# Patient Record
Sex: MALE | Race: WHITE | NOT HISPANIC OR LATINO | Employment: FULL TIME | URBAN - METROPOLITAN AREA
[De-identification: names, ages, dates, MRNs, and addresses within clinical notes are randomized per-mention and may not be internally consistent; named-entity substitution may affect disease eponyms.]

---

## 2021-07-17 ENCOUNTER — HOSPITAL ENCOUNTER (EMERGENCY)
Facility: HOSPITAL | Age: 51
Discharge: HOME | End: 2021-07-18
Attending: EMERGENCY MEDICINE | Admitting: EMERGENCY MEDICINE

## 2021-07-17 DIAGNOSIS — I10 ESSENTIAL HYPERTENSION: Primary | ICD-10-CM

## 2021-07-17 DIAGNOSIS — R53.83 EXHAUSTION: ICD-10-CM

## 2021-07-17 DIAGNOSIS — R25.1 SHAKING: ICD-10-CM

## 2021-07-17 PROCEDURE — 99283 EMERGENCY DEPT VISIT LOW MDM: CPT

## 2021-07-18 VITALS
OXYGEN SATURATION: 97 % | TEMPERATURE: 98.2 F | RESPIRATION RATE: 16 BRPM | SYSTOLIC BLOOD PRESSURE: 142 MMHG | DIASTOLIC BLOOD PRESSURE: 91 MMHG | HEART RATE: 68 BPM

## 2021-07-18 LAB
ALBUMIN SERPL-MCNC: 4.4 G/DL (ref 3.5–5.2)
ALBUMIN/GLOB SERPL: 1.9 G/DL
ALP SERPL-CCNC: 46 U/L (ref 39–117)
ALT SERPL W P-5'-P-CCNC: 24 U/L (ref 1–41)
ANION GAP SERPL CALCULATED.3IONS-SCNC: 11.9 MMOL/L (ref 5–15)
AST SERPL-CCNC: 17 U/L (ref 1–40)
BASOPHILS # BLD AUTO: 0.04 10*3/MM3 (ref 0–0.2)
BASOPHILS NFR BLD AUTO: 0.6 % (ref 0–1.5)
BILIRUB SERPL-MCNC: 0.2 MG/DL (ref 0–1.2)
BUN SERPL-MCNC: 14 MG/DL (ref 6–20)
BUN/CREAT SERPL: 14.9 (ref 7–25)
CALCIUM SPEC-SCNC: 9.3 MG/DL (ref 8.6–10.5)
CHLORIDE SERPL-SCNC: 106 MMOL/L (ref 98–107)
CO2 SERPL-SCNC: 25.1 MMOL/L (ref 22–29)
CREAT SERPL-MCNC: 0.94 MG/DL (ref 0.76–1.27)
D DIMER PPP FEU-MCNC: <0.27 MCGFEU/ML (ref 0–0.49)
DEPRECATED RDW RBC AUTO: 40.6 FL (ref 37–54)
EOSINOPHIL # BLD AUTO: 0.42 10*3/MM3 (ref 0–0.4)
EOSINOPHIL NFR BLD AUTO: 6.1 % (ref 0.3–6.2)
ERYTHROCYTE [DISTWIDTH] IN BLOOD BY AUTOMATED COUNT: 13 % (ref 12.3–15.4)
GFR SERPL CREATININE-BSD FRML MDRD: 85 ML/MIN/1.73
GLOBULIN UR ELPH-MCNC: 2.3 GM/DL
GLUCOSE SERPL-MCNC: 115 MG/DL (ref 65–99)
HCT VFR BLD AUTO: 42.2 % (ref 37.5–51)
HGB BLD-MCNC: 13.8 G/DL (ref 13–17.7)
IMM GRANULOCYTES # BLD AUTO: 0.02 10*3/MM3 (ref 0–0.05)
IMM GRANULOCYTES NFR BLD AUTO: 0.3 % (ref 0–0.5)
LYMPHOCYTES # BLD AUTO: 2.12 10*3/MM3 (ref 0.7–3.1)
LYMPHOCYTES NFR BLD AUTO: 30.9 % (ref 19.6–45.3)
MCH RBC QN AUTO: 28.4 PG (ref 26.6–33)
MCHC RBC AUTO-ENTMCNC: 32.7 G/DL (ref 31.5–35.7)
MCV RBC AUTO: 86.8 FL (ref 79–97)
MONOCYTES # BLD AUTO: 0.6 10*3/MM3 (ref 0.1–0.9)
MONOCYTES NFR BLD AUTO: 8.7 % (ref 5–12)
NEUTROPHILS NFR BLD AUTO: 3.67 10*3/MM3 (ref 1.7–7)
NEUTROPHILS NFR BLD AUTO: 53.4 % (ref 42.7–76)
NRBC BLD AUTO-RTO: 0 /100 WBC (ref 0–0.2)
PLATELET # BLD AUTO: 211 10*3/MM3 (ref 140–450)
PMV BLD AUTO: 9.9 FL (ref 6–12)
POTASSIUM SERPL-SCNC: 4.1 MMOL/L (ref 3.5–5.2)
PROT SERPL-MCNC: 6.7 G/DL (ref 6–8.5)
QT INTERVAL: 390 MS
RBC # BLD AUTO: 4.86 10*6/MM3 (ref 4.14–5.8)
SODIUM SERPL-SCNC: 143 MMOL/L (ref 136–145)
TROPONIN T SERPL-MCNC: <0.01 NG/ML (ref 0–0.03)
TSH SERPL DL<=0.05 MIU/L-ACNC: 3.86 UIU/ML (ref 0.27–4.2)
WBC # BLD AUTO: 6.87 10*3/MM3 (ref 3.4–10.8)

## 2021-07-18 PROCEDURE — 85025 COMPLETE CBC W/AUTO DIFF WBC: CPT | Performed by: EMERGENCY MEDICINE

## 2021-07-18 PROCEDURE — 93005 ELECTROCARDIOGRAM TRACING: CPT | Performed by: EMERGENCY MEDICINE

## 2021-07-18 PROCEDURE — 85379 FIBRIN DEGRADATION QUANT: CPT | Performed by: EMERGENCY MEDICINE

## 2021-07-18 PROCEDURE — 93010 ELECTROCARDIOGRAM REPORT: CPT | Performed by: INTERNAL MEDICINE

## 2021-07-18 PROCEDURE — 84484 ASSAY OF TROPONIN QUANT: CPT | Performed by: EMERGENCY MEDICINE

## 2021-07-18 PROCEDURE — 84443 ASSAY THYROID STIM HORMONE: CPT | Performed by: EMERGENCY MEDICINE

## 2021-07-18 PROCEDURE — 80053 COMPREHEN METABOLIC PANEL: CPT | Performed by: EMERGENCY MEDICINE

## 2021-07-18 RX ORDER — NEBIVOLOL 5 MG/1
5 TABLET ORAL DAILY
COMMUNITY

## 2021-07-18 RX ORDER — HYDROXYZINE HYDROCHLORIDE 25 MG/1
25 TABLET, FILM COATED ORAL 3 TIMES DAILY PRN
Qty: 15 TABLET | Refills: 0 | Status: SHIPPED | OUTPATIENT
Start: 2021-07-18

## 2021-07-18 RX ORDER — ALPRAZOLAM 0.25 MG/1
0.5 TABLET ORAL ONCE
Status: COMPLETED | OUTPATIENT
Start: 2021-07-18 | End: 2021-07-18

## 2021-07-18 RX ADMIN — ALPRAZOLAM 0.5 MG: 0.25 TABLET ORAL at 00:25

## 2021-07-18 NOTE — ED PROVIDER NOTES
EMERGENCY DEPARTMENT ENCOUNTER  Patient was placed in face mask in first look and the following protective measures were taken unless additional measures were taken and documented below in the ED course. Patient was wearing facemask when I entered the room and throughout our encounter. I wore full protective equipment throughout this patient encounter including a face mask, and gloves. Hand hygiene was performed before donning protective equipment and after removal when leaving the room.    Room Number:  17/17  Date of encounter:  7/18/2021  PCP: John Haq MD    HPI:  Context: Rodrigo Villanueva is a 50 y.o. male who presents to the ED c/o chief complaint of evaded blood pressure and shaking.  Patient states he slept approximately 4 hours last night and then drove from [place], [place] to here today.  He states he checked his blood pressure this evening and was 170/114.  He is on Bystolic, 5 mg and has not missed any doses of this.  He states once he saw his blood pressure he started feeling he was shaking all over and was nauseated.  He states he has a mild nervous stomach sensation in his upper abdomen.  He denies dizziness, diaphoresis, vomiting or fever.  He does not smoke or drink alcohol.  He denies headache or abdominal pain.    MEDICAL HISTORY REVIEW  Reviewed in EPIC    PAST MEDICAL HISTORY  Active Ambulatory Problems     Diagnosis Date Noted   • No Active Ambulatory Problems     Resolved Ambulatory Problems     Diagnosis Date Noted   • No Resolved Ambulatory Problems     No Additional Past Medical History       PAST SURGICAL HISTORY  No past surgical history on file.    FAMILY HISTORY  No family history on file.    SOCIAL HISTORY  Social History     Socioeconomic History   • Marital status:      Spouse name: Not on file   • Number of children: Not on file   • Years of education: Not on file   • Highest education level: Not on file       ALLERGIES  Patient has no known allergies.    The  patient's allergies have been reviewed    REVIEW OF SYSTEMS  All systems reviewed and negative except for those discussed in HPI.     PHYSICAL EXAM  I have reviewed the triage vital signs and nursing notes.  ED Triage Vitals [07/17/21 2338]   Temp Heart Rate Resp BP SpO2   98.2 °F (36.8 °C) 78 16 (!) 156/110 97 %      Temp src Heart Rate Source Patient Position BP Location FiO2 (%)   Tympanic Monitor -- -- --       General: Mild distress and anxious appearing.  HENT: NCAT, PERRL, Nares patent.  Eyes: no scleral icterus.  Ocular movements are intact.  Neck: trachea midline, no ROM limitations.  CV: regular rhythm, regular rate.  Respiratory: normal effort, CTAB.  Abdomen: soft, nondistended, NTTP, no rebound tenderness, no guarding or rigidity  : deferred.  Musculoskeletal: no deformity.  Neuro: alert, moves all extremities, follows commands.  Skin: warm, dry.    LAB RESULTS  Recent Results (from the past 24 hour(s))   ECG 12 Lead    Collection Time: 07/18/21 12:18 AM   Result Value Ref Range    QT Interval 390 ms   Comprehensive Metabolic Panel    Collection Time: 07/18/21 12:21 AM    Specimen: Blood   Result Value Ref Range    Glucose 115 (H) 65 - 99 mg/dL    BUN 14 6 - 20 mg/dL    Creatinine 0.94 0.76 - 1.27 mg/dL    Sodium 143 136 - 145 mmol/L    Potassium 4.1 3.5 - 5.2 mmol/L    Chloride 106 98 - 107 mmol/L    CO2 25.1 22.0 - 29.0 mmol/L    Calcium 9.3 8.6 - 10.5 mg/dL    Total Protein 6.7 6.0 - 8.5 g/dL    Albumin 4.40 3.50 - 5.20 g/dL    ALT (SGPT) 24 1 - 41 U/L    AST (SGOT) 17 1 - 40 U/L    Alkaline Phosphatase 46 39 - 117 U/L    Total Bilirubin 0.2 0.0 - 1.2 mg/dL    eGFR Non African Amer 85 >60 mL/min/1.73    Globulin 2.3 gm/dL    A/G Ratio 1.9 g/dL    BUN/Creatinine Ratio 14.9 7.0 - 25.0    Anion Gap 11.9 5.0 - 15.0 mmol/L   Troponin    Collection Time: 07/18/21 12:21 AM    Specimen: Blood   Result Value Ref Range    Troponin T <0.010 0.000 - 0.030 ng/mL   D-dimer, Quantitative    Collection Time:  07/18/21 12:21 AM    Specimen: Blood   Result Value Ref Range    D-Dimer, Quantitative <0.27 0.00 - 0.49 MCGFEU/mL   TSH    Collection Time: 07/18/21 12:21 AM    Specimen: Blood   Result Value Ref Range    TSH 3.860 0.270 - 4.200 uIU/mL   CBC Auto Differential    Collection Time: 07/18/21 12:21 AM    Specimen: Blood   Result Value Ref Range    WBC 6.87 3.40 - 10.80 10*3/mm3    RBC 4.86 4.14 - 5.80 10*6/mm3    Hemoglobin 13.8 13.0 - 17.7 g/dL    Hematocrit 42.2 37.5 - 51.0 %    MCV 86.8 79.0 - 97.0 fL    MCH 28.4 26.6 - 33.0 pg    MCHC 32.7 31.5 - 35.7 g/dL    RDW 13.0 12.3 - 15.4 %    RDW-SD 40.6 37.0 - 54.0 fl    MPV 9.9 6.0 - 12.0 fL    Platelets 211 140 - 450 10*3/mm3    Neutrophil % 53.4 42.7 - 76.0 %    Lymphocyte % 30.9 19.6 - 45.3 %    Monocyte % 8.7 5.0 - 12.0 %    Eosinophil % 6.1 0.3 - 6.2 %    Basophil % 0.6 0.0 - 1.5 %    Immature Grans % 0.3 0.0 - 0.5 %    Neutrophils, Absolute 3.67 1.70 - 7.00 10*3/mm3    Lymphocytes, Absolute 2.12 0.70 - 3.10 10*3/mm3    Monocytes, Absolute 0.60 0.10 - 0.90 10*3/mm3    Eosinophils, Absolute 0.42 (H) 0.00 - 0.40 10*3/mm3    Basophils, Absolute 0.04 0.00 - 0.20 10*3/mm3    Immature Grans, Absolute 0.02 0.00 - 0.05 10*3/mm3    nRBC 0.0 0.0 - 0.2 /100 WBC       I ordered the above labs and reviewed the results.    RADIOLOGY  No Radiology Exams Resulted Within Past 24 Hours    I ordered the above noted radiological studies. I reviewed the images and results. I agree with the radiologist interpretation.    PROCEDURES  Procedures    MEDICATIONS GIVEN IN ER  Medications   ALPRAZolam (XANAX) tablet 0.5 mg (0.5 mg Oral Given 7/18/21 0025)       PROGRESS, DATA ANALYSIS, CONSULTS, AND MEDICAL DECISION MAKING  A complete history and physical exam have been performed.  All available laboratory and imaging results have been reviewed by myself prior to disposition.    MDM  After the initial H&P, I discussed pertinent information from history and physical exam with patient/family.   Discussed differential diagnosis.  Discussed plan for ED evaluation/work-up/treatment.  All questions answered.  Patient/family is agreeable with plan.  ED Course as of Jul 18 0231   Sun Jul 18, 2021   0018 Patient presents with elevated blood pressure, anxious feeling and shaking.  We will give Xanax and check his thyroid level, troponin and D-dimer given that patient drove all day from [place].    [DE]   0029 EKG          EKG time: 0018  Rhythm/Rate: Normal sinus rhythm, rate 74  P waves and CO: normal  QRS, axis: normal   ST and T waves: normal     Interpreted Contemporaneously by me, independently viewed  Previous EKG      [DE]   0129 Today patient the results of his blood work.  Patient states he is feeling better and no longer shaky.  He asked what may be the cause of the symptoms and I suggested the lack of sleep and long drive today may have caused the stress which elevated his blood pressure.   BP: 143/94 [DE]      ED Course User Index  [DE] Calvin Cox MD       AS OF 02:31 EDT VITALS:    BP - 142/91  HR - 68  TEMP - 98.2 °F (36.8 °C) (Tympanic)  O2 SATS - 97%    DIAGNOSIS  Final diagnoses:   Essential hypertension   Shaking   Exhaustion         DISPOSITION    DISCHARGE    Patient discharged in stable condition.    Reviewed implications of results, diagnosis, meds, responsibility to follow up, warning signs and symptoms of possible worsening, potential complications and reasons to return to ER.    Patient/Family voiced understanding of above instructions.    Discussed plan for discharge, as there is no emergent indication for admission. Patient referred to primary care provider for BP management due to today's BP. Pt/family is agreeable and understands need for follow up and repeat testing.  Pt is aware that discharge does not mean that nothing is wrong but it indicates no emergency is present that requires admission and they must continue care with follow-up as given below or physician of  their choice.     FOLLOW-UP  John Haq MD  [address] [facility] [address] [phone]    Schedule an appointment as soon as possible for a visit            Medication List      New Prescriptions    hydrOXYzine 25 MG tablet  Commonly known as: ATARAX  Take 1 tablet by mouth 3 (Three) Times a Day As Needed for Anxiety.           Where to Get Your Medications      You can get these medications from any pharmacy    Bring a paper prescription for each of these medications  · hydrOXYzine 25 MG tablet          Calvin Cox MD  07/18/21 4664

## 2021-07-18 NOTE — DISCHARGE INSTRUCTIONS
Your home blood pressure medications and follow-up with your family doctor.  Please return to the emergency department symptoms worsen.  Use the hydroxyzine as needed if you start to shake.

## 2021-07-18 NOTE — ED NOTES
pt to er from home via PV c/o high blood pressure. pt checked it an hour ago and it was elevated. pt in mask in triage. Pt denies any symptoms other than shaking.   Triage in appropriate PPE     Laura Damon RN  07/17/21 2571

## 2021-09-15 ENCOUNTER — TELEPHONE (OUTPATIENT)
Dept: GASTROENTEROLOGY | Facility: CLINIC | Age: 51
End: 2021-09-15

## 2021-10-13 ENCOUNTER — OFFICE VISIT (OUTPATIENT)
Dept: GASTROENTEROLOGY | Facility: CLINIC | Age: 51
End: 2021-10-13

## 2021-10-13 ENCOUNTER — TELEPHONE (OUTPATIENT)
Dept: GASTROENTEROLOGY | Facility: CLINIC | Age: 51
End: 2021-10-13

## 2021-10-13 VITALS — WEIGHT: 212 LBS | BODY MASS INDEX: 31.4 KG/M2 | TEMPERATURE: 97.3 F | HEIGHT: 69 IN

## 2021-10-13 DIAGNOSIS — Z12.11 ENCOUNTER FOR SCREENING FOR MALIGNANT NEOPLASM OF COLON: ICD-10-CM

## 2021-10-13 DIAGNOSIS — R10.13 DYSPEPSIA: Primary | ICD-10-CM

## 2021-10-13 DIAGNOSIS — R10.13 EPIGASTRIC PAIN: ICD-10-CM

## 2021-10-13 PROCEDURE — 99204 OFFICE O/P NEW MOD 45 MIN: CPT | Performed by: NURSE PRACTITIONER

## 2021-10-13 RX ORDER — CHOLECALCIFEROL (VITAMIN D3) 125 MCG
500 CAPSULE ORAL DAILY
COMMUNITY

## 2021-10-13 RX ORDER — ESOMEPRAZOLE MAGNESIUM 40 MG/1
40 CAPSULE, DELAYED RELEASE ORAL
COMMUNITY
Start: 2021-09-08

## 2021-10-13 NOTE — PROGRESS NOTES
Chief Complaint   Patient presents with   • Heartburn       HPI     Patient seen today with the help of the [facility] .    Rodrigo Villanueva is a  51 y.o. male here establish care as a new patient for complaints of dyspepsia.    This patient will also follow with Dr. Newsome.    Patient reports 5 months of epigastric pressure/discomfort with heartburn.  No correlation to eating.  No nausea, vomiting, dysphagia, odynophagia.  Started on PPI therapy by his primary care provider with some improvement then referred to our services.    No lower GI complaints.    He needs a screening colonoscopy as well.    No family history of colon cancer.    He does not smoke, drink alcohol, or use NSAIDs.    Recent CBC and CMP normal.    History reviewed. No pertinent past medical history.    History reviewed. No pertinent surgical history.    Scheduled Meds:     Continuous Infusions: No current facility-administered medications for this visit.      PRN Meds:     No Known Allergies    Social History     Socioeconomic History   • Marital status:    Tobacco Use   • Smoking status: Former Smoker     Packs/day: 1.00     Types: Cigarettes   • Smokeless tobacco: Never Used   • Tobacco comment: quit 16 years ago   Substance and Sexual Activity   • Alcohol use: Not Currently     Comment: quit 10 years ago       History reviewed. No pertinent family history.    Review of Systems   Constitutional: Negative for activity change, appetite change, fatigue, fever and unexpected weight change.   HENT: Negative for trouble swallowing.    Eyes: Negative.    Respiratory: Negative.  Negative for apnea, cough, choking, chest tightness, shortness of breath and wheezing.    Cardiovascular: Negative.  Negative for chest pain, palpitations and leg swelling.   Gastrointestinal: Positive for abdominal pain. Negative for abdominal distention, anal bleeding, blood in stool, constipation, diarrhea, nausea, rectal pain and vomiting.   Endocrine: Negative.     Genitourinary: Negative.    Musculoskeletal: Negative.    Allergic/Immunologic: Negative.    Neurological: Negative.    Hematological: Negative.    Psychiatric/Behavioral: Negative.        Vitals:    10/13/21 1039   Temp: 97.3 °F (36.3 °C)       Physical Exam  Constitutional:       Appearance: He is well-developed.   Abdominal:      General: Bowel sounds are normal. There is no distension.      Palpations: Abdomen is soft. There is no mass.      Tenderness: There is no abdominal tenderness. There is no guarding.      Hernia: No hernia is present.   Skin:     General: Skin is warm and dry.      Capillary Refill: Capillary refill takes less than 2 seconds.   Neurological:      Mental Status: He is alert and oriented to person, place, and time.   Psychiatric:         Behavior: Behavior normal.     Assessment    Diagnoses and all orders for this visit:    1. Dyspepsia (Primary)  -     Case Request; Standing  -     Case Request  -     Celiac Comprehensive Panel    2. Epigastric pain  -     Case Request; Standing  -     Case Request  -     Celiac Comprehensive Panel    3. Encounter for screening for malignant neoplasm of colon  -     Case Request; Standing  -     Case Request    Plan    Pleasant 51-year-old male seen today with the help of the [facility]  for dyspepsia and epigastric pain also need of a screening colonoscopy.  Recommend bidirectional endoscopic evaluation rule out esophagitis, gastritis, peptic ulcer disease, high suspicion for H. pylori, celiac disease, hiatal hernia, etc. as well as screen for colon polyps.  Continue PPI therapy.  Celiac comprehensive panel today.  Follow an antireflux diet.  Further recommendations and follow-up pending the aforementioned work-up.         ANTONELLA Driver  [facility]  [address] [place] [address]  Office: [phone]

## 2021-10-14 LAB
ENDOMYSIUM IGA SER QL: NEGATIVE
GLIADIN PEPTIDE IGA SER-ACNC: 4 UNITS (ref 0–19)
GLIADIN PEPTIDE IGG SER-ACNC: 2 UNITS (ref 0–19)
IGA SERPL-MCNC: 139 MG/DL (ref 90–386)
TTG IGA SER-ACNC: <2 U/ML (ref 0–3)
TTG IGG SER-ACNC: <2 U/ML (ref 0–5)

## 2021-10-18 ENCOUNTER — TELEPHONE (OUTPATIENT)
Dept: GASTROENTEROLOGY | Facility: CLINIC | Age: 51
End: 2021-10-18

## 2021-10-18 NOTE — TELEPHONE ENCOUNTER
----- Message from ANTONELLA Driver sent at 10/18/2021 12:46 PM EDT -----  Please let the patient know his labs are normal.

## 2021-10-27 ENCOUNTER — TELEPHONE (OUTPATIENT)
Dept: GASTROENTEROLOGY | Facility: CLINIC | Age: 51
End: 2021-10-27

## 2021-10-27 NOTE — TELEPHONE ENCOUNTER
Patient has not read his my chart results. Letter mailed.     Please let the patient know his labs are normal.

## 2021-12-03 ENCOUNTER — TELEPHONE (OUTPATIENT)
Dept: GASTROENTEROLOGY | Facility: CLINIC | Age: 51
End: 2021-12-03

## 2021-12-03 PROBLEM — R10.13 DYSPEPSIA: Status: ACTIVE | Noted: 2021-12-03

## 2021-12-03 PROBLEM — Z12.11 ENCOUNTER FOR SCREENING FOR MALIGNANT NEOPLASM OF COLON: Status: ACTIVE | Noted: 2021-12-03

## 2021-12-03 PROBLEM — R10.13 EPIGASTRIC PAIN: Status: ACTIVE | Noted: 2021-12-03

## 2021-12-03 NOTE — TELEPHONE ENCOUNTER
RSW Mickie for colonoscopy/EGD on 12/14  arrive at 10am   . Mailed Prep instructions to Mailing address on-file. ----[facility]    Advised PT  that  will call with final arrival time  24 hrs before procedure. If they do not get a phone call, arrival time will stay the same as given on instructions

## 2021-12-07 ENCOUNTER — TRANSCRIBE ORDERS (OUTPATIENT)
Dept: ADMINISTRATIVE | Facility: HOSPITAL | Age: 51
End: 2021-12-07

## 2021-12-07 DIAGNOSIS — Z01.818 OTHER SPECIFIED PRE-OPERATIVE EXAMINATION: Primary | ICD-10-CM

## 2021-12-09 ENCOUNTER — TELEPHONE (OUTPATIENT)
Dept: GASTROENTEROLOGY | Facility: CLINIC | Age: 51
End: 2021-12-09

## 2021-12-09 DIAGNOSIS — Z01.818 PREOP TESTING: Primary | ICD-10-CM

## 2021-12-09 NOTE — TELEPHONE ENCOUNTER
----- Message from Patric Cotter sent at 12/9/2021 11:28 AM EST -----  Regarding: COVID Test  Good Morning,    Patient need a Covid test order created and sent to the practitioner for signature. We need order signed by 3pm

## 2021-12-11 ENCOUNTER — LAB (OUTPATIENT)
Dept: LAB | Facility: HOSPITAL | Age: 51
End: 2021-12-11

## 2021-12-11 LAB — SARS-COV-2 ORF1AB RESP QL NAA+PROBE: NOT DETECTED

## 2021-12-11 PROCEDURE — U0004 COV-19 TEST NON-CDC HGH THRU: HCPCS | Performed by: INTERNAL MEDICINE

## 2021-12-11 PROCEDURE — C9803 HOPD COVID-19 SPEC COLLECT: HCPCS | Performed by: INTERNAL MEDICINE

## 2021-12-14 ENCOUNTER — ANESTHESIA (OUTPATIENT)
Dept: GASTROENTEROLOGY | Facility: HOSPITAL | Age: 51
End: 2021-12-14

## 2021-12-14 ENCOUNTER — ANESTHESIA EVENT (OUTPATIENT)
Dept: GASTROENTEROLOGY | Facility: HOSPITAL | Age: 51
End: 2021-12-14

## 2021-12-14 ENCOUNTER — HOSPITAL ENCOUNTER (OUTPATIENT)
Facility: HOSPITAL | Age: 51
Setting detail: HOSPITAL OUTPATIENT SURGERY
Discharge: HOME | End: 2021-12-14
Attending: INTERNAL MEDICINE | Admitting: INTERNAL MEDICINE

## 2021-12-14 VITALS
DIASTOLIC BLOOD PRESSURE: 78 MMHG | BODY MASS INDEX: 33.19 KG/M2 | WEIGHT: 219 LBS | HEIGHT: 68 IN | RESPIRATION RATE: 14 BRPM | HEART RATE: 73 BPM | OXYGEN SATURATION: 99 % | SYSTOLIC BLOOD PRESSURE: 102 MMHG | TEMPERATURE: 97.8 F

## 2021-12-14 DIAGNOSIS — Z12.11 ENCOUNTER FOR SCREENING FOR MALIGNANT NEOPLASM OF COLON: ICD-10-CM

## 2021-12-14 DIAGNOSIS — R10.13 DYSPEPSIA: ICD-10-CM

## 2021-12-14 DIAGNOSIS — R10.13 EPIGASTRIC PAIN: ICD-10-CM

## 2021-12-14 PROCEDURE — 43239 EGD BIOPSY SINGLE/MULTIPLE: CPT | Performed by: INTERNAL MEDICINE

## 2021-12-14 PROCEDURE — S0260 H&P FOR SURGERY: HCPCS | Performed by: INTERNAL MEDICINE

## 2021-12-14 PROCEDURE — 25010000002 PROPOFOL 10 MG/ML EMULSION: Performed by: REGISTERED NURSE

## 2021-12-14 PROCEDURE — 88305 TISSUE EXAM BY PATHOLOGIST: CPT | Performed by: INTERNAL MEDICINE

## 2021-12-14 PROCEDURE — 45378 DIAGNOSTIC COLONOSCOPY: CPT | Performed by: INTERNAL MEDICINE

## 2021-12-14 RX ORDER — SODIUM CHLORIDE 0.9 % (FLUSH) 0.9 %
10 SYRINGE (ML) INJECTION AS NEEDED
Status: DISCONTINUED | OUTPATIENT
Start: 2021-12-14 | End: 2021-12-14 | Stop reason: HOSPADM

## 2021-12-14 RX ORDER — LIDOCAINE HYDROCHLORIDE 10 MG/ML
0.5 INJECTION, SOLUTION INFILTRATION; PERINEURAL ONCE AS NEEDED
Status: DISCONTINUED | OUTPATIENT
Start: 2021-12-14 | End: 2021-12-14 | Stop reason: HOSPADM

## 2021-12-14 RX ORDER — SODIUM CHLORIDE, SODIUM LACTATE, POTASSIUM CHLORIDE, CALCIUM CHLORIDE 600; 310; 30; 20 MG/100ML; MG/100ML; MG/100ML; MG/100ML
1000 INJECTION, SOLUTION INTRAVENOUS CONTINUOUS
Status: DISCONTINUED | OUTPATIENT
Start: 2021-12-14 | End: 2021-12-14 | Stop reason: HOSPADM

## 2021-12-14 RX ORDER — LIDOCAINE HYDROCHLORIDE 20 MG/ML
INJECTION, SOLUTION INFILTRATION; PERINEURAL AS NEEDED
Status: DISCONTINUED | OUTPATIENT
Start: 2021-12-14 | End: 2021-12-14 | Stop reason: SURG

## 2021-12-14 RX ORDER — FEXOFENADINE HCL 180 MG/1
180 TABLET ORAL DAILY
COMMUNITY

## 2021-12-14 RX ORDER — PROPOFOL 10 MG/ML
VIAL (ML) INTRAVENOUS AS NEEDED
Status: DISCONTINUED | OUTPATIENT
Start: 2021-12-14 | End: 2021-12-14 | Stop reason: SURG

## 2021-12-14 RX ADMIN — SODIUM CHLORIDE, POTASSIUM CHLORIDE, SODIUM LACTATE AND CALCIUM CHLORIDE 1000 ML: 600; 310; 30; 20 INJECTION, SOLUTION INTRAVENOUS at 10:09

## 2021-12-14 RX ADMIN — LIDOCAINE HYDROCHLORIDE 60 MG: 20 INJECTION, SOLUTION INFILTRATION; PERINEURAL at 10:36

## 2021-12-14 RX ADMIN — PROPOFOL 60 MG: 10 INJECTION, EMULSION INTRAVENOUS at 10:36

## 2021-12-14 RX ADMIN — PROPOFOL 160 MCG/KG/MIN: 10 INJECTION, EMULSION INTRAVENOUS at 10:36

## 2021-12-14 NOTE — H&P
"[facility]  Pre Procedure History & Physical    Chief Complaint:   Time for my colonoscopy and my EGD    Subjective     HPI:   51 y.o. male with dyspepsia and in need of screening colonoscopy    Past Medical History:   Past Medical History:   Diagnosis Date   • GERD (gastroesophageal reflux disease)    • Hypertension        Family History:  History reviewed. No pertinent family history.    Social History:   reports that he has quit smoking. His smoking use included cigarettes. He smoked 1.00 pack per day. He has never used smokeless tobacco. He reports previous alcohol use. He reports that he does not use drugs.    Medications:   Medications Prior to Admission   Medication Sig Dispense Refill Last Dose   • esomeprazole (nexIUM) 40 MG capsule Take 40 mg by mouth Every Morning Before Breakfast.   12/13/2021 at 0900   • fexofenadine (ALLEGRA) 180 MG tablet Take 180 mg by mouth Daily.   12/13/2021 at 1400   • nebivolol (Bystolic) 5 MG tablet Take 5 mg by mouth Daily.   12/13/2021 at 1400   • vitamin B-12 (CYANOCOBALAMIN) 500 MCG tablet Take 500 mcg by mouth Daily.   12/13/2021 at 0900   • vitamin D3 125 MCG (5000 UT) capsule capsule Take 5,000 Units by mouth Daily.   12/13/2021 at 0900   • hydrOXYzine (ATARAX) 25 MG tablet Take 1 tablet by mouth 3 (Three) Times a Day As Needed for Anxiety. 15 tablet 0 More than a month at Unknown time       Allergies:  Patient has no known allergies.    ROS:    Pertinent items are noted in HPI     Objective     Blood pressure 131/84, pulse 73, temperature 97.8 °F (36.6 °C), temperature source Oral, resp. rate 16, height 172.7 cm (68\"), weight 99.3 kg (219 lb), SpO2 97 %.    Physical Exam   Constitutional: Pt is oriented to person, place, and time and well-developed, well-nourished, and in no distress.   Abdominal: Soft.   Psychiatric: Mood, memory, affect and judgment normal.     Assessment/Plan     Diagnosis:  Encounter for screening for colon cancer and " dyspepsia    Anticipated Surgical Procedure:  Colonoscopy and an EGD    The risks, benefits, and alternatives of this procedure have been discussed with the patient or the responsible party- the patient understands and agrees to proceed.

## 2021-12-14 NOTE — ANESTHESIA PREPROCEDURE EVALUATION
Anesthesia Evaluation     Patient summary reviewed and Nursing notes reviewed   no history of anesthetic complications:               Airway   Mallampati: II  TM distance: >3 FB  Neck ROM: full  no difficulty expected  Dental - normal exam     Pulmonary - negative pulmonary ROS    breath sounds clear to auscultation  (-) shortness of breath, sleep apnea, decreased breath sounds, wheezes  Cardiovascular - normal exam  Exercise tolerance: good (4-7 METS)    Rhythm: regular  Rate: normal    (+) hypertension,   (-) past MI, angina, CHF, orthopnea, PND, BENAVIDEZ, PVD      Neuro/Psych- negative ROS  (-) seizures, neuromuscular disease, TIA, CVA, dizziness/light headedness, weakness, numbness  GI/Hepatic/Renal/Endo    (+)  GERD,    (-) liver disease, diabetes    Musculoskeletal (-) negative ROS    Abdominal  - normal exam   Substance History - negative use  (-) alcohol use, drug use     OB/GYN negative ob/gyn ROS         Other - negative ROS                       Anesthesia Plan    ASA 2     MAC   (D/W pt. MAC and possible awareness intra op.  Pt understands MAC and GA are not the same and the possibility of GA being required for failed MAC)  intravenous induction     Anesthetic plan, all risks, benefits, and alternatives have been provided, discussed and informed consent has been obtained with: patient.

## 2021-12-14 NOTE — ANESTHESIA POSTPROCEDURE EVALUATION
"Patient: Rodrigo Villanueva    Procedure Summary     Date: 12/14/21 Room / Location:  [facility]    Anesthesia Start: 1032 Anesthesia Stop: 1107    Procedures:       ESOPHAGOGASTRODUODENOSCOPY WITH BIOPSIES (N/A Esophagus)      COLONOSCOPY INTO CECUM/ TERMINAL ILEUM (N/A ) Diagnosis:       Dyspepsia      Epigastric pain      Encounter for screening for malignant neoplasm of colon      (Dyspepsia [R10.13])      (Epigastric pain [R10.13])      (Encounter for screening for malignant neoplasm of colon [Z12.11])    Surgeons: Reyes Newsome MD Provider: Jairo Kaye MD    Anesthesia Type: MAC ASA Status: 2          Anesthesia Type: MAC    Vitals  Vitals Value Taken Time   /78 12/14/21 1123   Temp     Pulse 73 12/14/21 1123   Resp 14 12/14/21 1123   SpO2 99 % 12/14/21 1123           Post Anesthesia Care and Evaluation    Patient location during evaluation: bedside  Patient participation: complete - patient participated  Level of consciousness: awake  Pain score: 2  Pain management: adequate  Airway patency: patent  Anesthetic complications: No anesthetic complications  PONV Status: none  Cardiovascular status: acceptable  Respiratory status: acceptable  Hydration status: acceptable    Comments: /78 (BP Location: Left arm, Patient Position: Sitting)   Pulse 73   Temp 36.6 °C (97.8 °F) (Oral)   Resp 14   Ht 172.7 cm (68\")   Wt 99.3 kg (219 lb)   SpO2 99%   BMI 33.30 kg/m²         "

## 2021-12-15 LAB
LAB AP CASE REPORT: NORMAL
PATH REPORT.FINAL DX SPEC: NORMAL
PATH REPORT.GROSS SPEC: NORMAL

## 2021-12-20 ENCOUNTER — TELEPHONE (OUTPATIENT)
Dept: GASTROENTEROLOGY | Facility: CLINIC | Age: 51
End: 2021-12-20

## 2021-12-20 NOTE — TELEPHONE ENCOUNTER
Called pt using [facility]  #[phone], pt not at home.   left msg for pt to call back if he has access to an .    Colonoscopy placed in recall for 12/14/2031.

## 2021-12-20 NOTE — TELEPHONE ENCOUNTER
----- Message from Reyes Newsome MD sent at 12/17/2021  3:00 PM EST -----  Pathology benignColonoscopy recall 10 yearsOffice visit Orin 6 months

## 2022-05-12 ENCOUNTER — OFFICE VISIT (OUTPATIENT)
Dept: GASTROENTEROLOGY | Facility: CLINIC | Age: 52
End: 2022-05-12

## 2022-05-12 VITALS
BODY MASS INDEX: 34.28 KG/M2 | HEIGHT: 67 IN | HEART RATE: 66 BPM | SYSTOLIC BLOOD PRESSURE: 130 MMHG | OXYGEN SATURATION: 95 % | WEIGHT: 218.4 LBS | TEMPERATURE: 96.3 F | DIASTOLIC BLOOD PRESSURE: 80 MMHG

## 2022-05-12 DIAGNOSIS — R79.89 ELEVATED LIVER FUNCTION TESTS: Primary | ICD-10-CM

## 2022-05-12 PROCEDURE — 99214 OFFICE O/P EST MOD 30 MIN: CPT | Performed by: NURSE PRACTITIONER

## 2022-05-12 RX ORDER — CETIRIZINE HYDROCHLORIDE 10 MG/1
10 TABLET ORAL DAILY
COMMUNITY
Start: 2022-03-21

## 2022-05-12 NOTE — PROGRESS NOTES
Chief Complaint   Patient presents with   • abnormal LFT       HPI    Rodrigo Villanueva is a  51 y.o. male here for a follow up visit for abnormal liver function test.    This patient follows with Dr. Newsome and myself.    On visit today patient reports feeling quite well.  No further issues with dyspepsia as mentioned on last office visit.  Takes Nexium 40 mg once daily.  Denies nausea, vomiting, abdominal pain, diarrhea, constipation, rectal bleeding.    Reports recent lab work performed by his primary care provider showed elevated liver function test but lab work is unfortunately unavailable for review.  He also reports having a liver ultrasound again also unavailable for review.    He denies risk factors for liver disease.  He does not drink alcohol.  He denies prior hepatitis exposure.  He is never used IV drugs.  No tattoo work.  Denies regular NSAID use or herbal remedies.    Additional data reviewed:    12/2021 EGD w/ normal findings  12/2021 C-scope w/ Nonbleeding internal hemorrhoids otherwise normal. Recall 10 years.    Past Medical History:   Diagnosis Date   • GERD (gastroesophageal reflux disease)    • Hypertension        Past Surgical History:   Procedure Laterality Date   • COLONOSCOPY N/A 12/14/2021    Procedure: COLONOSCOPY INTO CECUM/ TERMINAL ILEUM;  Surgeon: Reyes Newsome MD;  Location: [facility];  Service: Gastroenterology;  Laterality: N/A;  pre: screening  post: hemorrhoids, normal TI   • ENDOSCOPY N/A 12/14/2021    Procedure: ESOPHAGOGASTRODUODENOSCOPY WITH BIOPSIES;  Surgeon: Reyes Newsome MD;  Location: [facility] ENDOSCOPY;  Service: Gastroenterology;  Laterality: N/A;  pre: GERD  post: normal       Scheduled Meds:     Continuous Infusions: No current facility-administered medications for this visit.      PRN Meds:     No Known Allergies    Social History     Socioeconomic History   • Marital status:    Tobacco Use   • Smoking status: Former Smoker     Packs/day: 1.00     Types:  Cigarettes   • Smokeless tobacco: Never Used   • Tobacco comment: quit 16 years ago   Vaping Use   • Vaping Use: Never used   Substance and Sexual Activity   • Alcohol use: Not Currently     Comment: quit 10 years ago   • Drug use: Never   • Sexual activity: Defer       History reviewed. No pertinent family history.    Review of Systems   Constitutional: Negative for activity change, appetite change, fatigue, fever and unexpected weight change.   HENT: Negative for trouble swallowing.    Respiratory: Negative for apnea, cough, choking, chest tightness, shortness of breath and wheezing.    Cardiovascular: Negative for chest pain, palpitations and leg swelling.   Gastrointestinal: Negative for abdominal distention, abdominal pain, anal bleeding, blood in stool, constipation, diarrhea, nausea, rectal pain and vomiting.       Vitals:    05/12/22 0920   BP: 130/80   Pulse: 66   Temp: 96.3 °F (35.7 °C)   SpO2: 95%       Physical Exam  Constitutional:       Appearance: He is well-developed.   Abdominal:      General: Bowel sounds are normal. There is no distension.      Palpations: Abdomen is soft. There is no mass.      Tenderness: There is no abdominal tenderness. There is no guarding.      Hernia: No hernia is present.   Skin:     General: Skin is warm and dry.      Capillary Refill: Capillary refill takes less than 2 seconds.   Neurological:      Mental Status: He is alert and oriented to person, place, and time.   Psychiatric:         Behavior: Behavior normal.     Assessment    Diagnoses and all orders for this visit:    1. Elevated liver function tests (Primary)  -     Comprehensive Metabolic Panel  -     Alpha - 1 - Antitrypsin  -     BRENDEN  -     Anti-Smooth Muscle Antibody Titer  -     Ceruloplasmin  -     Gamma GT  -     Hemochromatosis Mutation  -     Hepatitis Panel, Acute  -     Mitochondrial Antibodies, M2  -     CHUA Fibrosure  -     Protein Elec + Interp, Serum  -     CBC & Differential       Plan    Very  pleasant 51-year-old male seen today in follow-up referred back to our services for elevated liver function test unfortunately lab work not available for review.  Today will reassess LFTs and draw lab work to rule out autoimmune, viral, metabolic liver disease.  Obtain a copy of recent liver ultrasound for my review.  Follow-up and further recommendations pending the aforementioned work-up.           ANTONELLA Driver  [facility]  [address] [place] [address]  Office: [phone]

## 2022-05-13 LAB
A1AT SERPL-MCNC: 126 MG/DL (ref 101–187)
ALBUMIN SERPL ELPH-MCNC: 3.8 G/DL (ref 2.9–4.4)
ALBUMIN SERPL-MCNC: 4.5 G/DL (ref 4–5)
ALBUMIN/GLOB SERPL: 1.3 {RATIO} (ref 0.7–1.7)
ALBUMIN/GLOB SERPL: 2 {RATIO} (ref 1.2–2.2)
ALP SERPL-CCNC: 54 IU/L (ref 44–121)
ALPHA1 GLOB SERPL ELPH-MCNC: 0.2 G/DL (ref 0–0.4)
ALPHA2 GLOB SERPL ELPH-MCNC: 0.6 G/DL (ref 0.4–1)
ALT SERPL-CCNC: 35 IU/L (ref 0–44)
ANA SER QL: NEGATIVE
AST SERPL-CCNC: 26 IU/L (ref 0–40)
B-GLOBULIN SERPL ELPH-MCNC: 1 G/DL (ref 0.7–1.3)
BASOPHILS # BLD AUTO: 0.1 X10E3/UL (ref 0–0.2)
BASOPHILS NFR BLD AUTO: 1 %
BILIRUB SERPL-MCNC: 0.5 MG/DL (ref 0–1.2)
BUN SERPL-MCNC: 15 MG/DL (ref 6–24)
BUN/CREAT SERPL: 15 (ref 9–20)
CALCIUM SERPL-MCNC: 9.8 MG/DL (ref 8.7–10.2)
CERULOPLASMIN SERPL-MCNC: 16.8 MG/DL (ref 16–31)
CHLORIDE SERPL-SCNC: 103 MMOL/L (ref 96–106)
CO2 SERPL-SCNC: 25 MMOL/L (ref 20–29)
CREAT SERPL-MCNC: 1 MG/DL (ref 0.76–1.27)
EGFRCR SERPLBLD CKD-EPI 2021: 92 ML/MIN/1.73
EOSINOPHIL # BLD AUTO: 0.5 X10E3/UL (ref 0–0.4)
EOSINOPHIL NFR BLD AUTO: 8 %
ERYTHROCYTE [DISTWIDTH] IN BLOOD BY AUTOMATED COUNT: 13.4 % (ref 11.6–15.4)
GAMMA GLOB SERPL ELPH-MCNC: 1.2 G/DL (ref 0.4–1.8)
GGT SERPL-CCNC: 44 IU/L (ref 0–65)
GLOBULIN SER CALC-MCNC: 2.3 G/DL (ref 1.5–4.5)
GLOBULIN SER CALC-MCNC: 3 G/DL (ref 2.2–3.9)
GLUCOSE SERPL-MCNC: 89 MG/DL (ref 65–99)
HAV IGM SERPL QL IA: NEGATIVE
HBV CORE IGM SERPL QL IA: NEGATIVE
HBV SURFACE AG SERPL QL IA: NEGATIVE
HCT VFR BLD AUTO: 44.1 % (ref 37.5–51)
HCV AB S/CO SERPL IA: <0.1 S/CO RATIO (ref 0–0.9)
HCV AB SERPL QL IA: NORMAL
HGB BLD-MCNC: 14.6 G/DL (ref 13–17.7)
IMM GRANULOCYTES # BLD AUTO: 0 X10E3/UL (ref 0–0.1)
IMM GRANULOCYTES NFR BLD AUTO: 0 %
LABORATORY COMMENT REPORT: NORMAL
LYMPHOCYTES # BLD AUTO: 1.9 X10E3/UL (ref 0.7–3.1)
LYMPHOCYTES NFR BLD AUTO: 31 %
M PROTEIN SERPL ELPH-MCNC: NORMAL G/DL
MCH RBC QN AUTO: 28.3 PG (ref 26.6–33)
MCHC RBC AUTO-ENTMCNC: 33.1 G/DL (ref 31.5–35.7)
MCV RBC AUTO: 86 FL (ref 79–97)
MITOCHONDRIA M2 IGG SER-ACNC: <20 UNITS (ref 0–20)
MONOCYTES # BLD AUTO: 0.5 X10E3/UL (ref 0.1–0.9)
MONOCYTES NFR BLD AUTO: 8 %
NEUTROPHILS # BLD AUTO: 3.2 X10E3/UL (ref 1.4–7)
NEUTROPHILS NFR BLD AUTO: 52 %
PLATELET # BLD AUTO: 194 X10E3/UL (ref 150–450)
POTASSIUM SERPL-SCNC: 4.2 MMOL/L (ref 3.5–5.2)
PROT PATTERN SERPL ELPH-IMP: NORMAL
PROT SERPL-MCNC: 6.8 G/DL (ref 6–8.5)
RBC # BLD AUTO: 5.15 X10E6/UL (ref 4.14–5.8)
SMA IGG SER-ACNC: 14 UNITS (ref 0–19)
SODIUM SERPL-SCNC: 141 MMOL/L (ref 134–144)
WBC # BLD AUTO: 6.1 X10E3/UL (ref 3.4–10.8)

## 2022-05-16 ENCOUNTER — TELEPHONE (OUTPATIENT)
Dept: GASTROENTEROLOGY | Facility: CLINIC | Age: 52
End: 2022-05-16

## 2022-05-16 LAB
A2 MACROGLOB SERPL-MCNC: 154 MG/DL (ref 110–276)
ALT SERPL W P-5'-P-CCNC: 42 IU/L (ref 0–55)
APO A-I SERPL-MCNC: 132 MG/DL (ref 101–178)
AST SERPL W P-5'-P-CCNC: 24 IU/L (ref 0–40)
BILIRUB SERPL-MCNC: 0.4 MG/DL (ref 0–1.2)
CHOLEST SERPL-MCNC: 173 MG/DL (ref 100–199)
FIBROSIS SCORING:: ABNORMAL
FIBROSIS STAGE SERPL QL: ABNORMAL
GGT SERPL-CCNC: 45 IU/L (ref 0–65)
GLUCOSE SERPL-MCNC: 92 MG/DL (ref 65–99)
HAPTOGLOB SERPL-MCNC: 67 MG/DL (ref 23–355)
INTERPRETATIONS: (REFERENCE): ABNORMAL
LABORATORY COMMENT REPORT: ABNORMAL
LIVER FIBR SCORE SERPL CALC.FIBROSURE: 0.24 (ref 0–0.21)
NASH SCORING (REFERENCE): ABNORMAL
NECROINFLAMMATORY ACT GRADE SERPL QL: ABNORMAL
NECROINFLAMMATORY ACT SCORE SERPL: 0.5
SERVICE CMNT-IMP: ABNORMAL
STEATOSIS GRADE (REFERENCE): ABNORMAL
STEATOSIS GRADING (REFERENCE): ABNORMAL
STEATOSIS SCORE (REFERENCE): 0.58 (ref 0–0.3)
TRIGL SERPL-MCNC: 96 MG/DL (ref 0–149)

## 2022-05-16 NOTE — TELEPHONE ENCOUNTER
----- Message from ANTONELLA Driver sent at 5/12/2022  9:57 AM EDT -----  I need a copy of the lab work (liver function tests ) and liver ultrasound with patient's primary care provider

## 2022-05-17 NOTE — TELEPHONE ENCOUNTER
Right upper quad ultrasound received.  Called pcp and asked them to send the labs we also requested, spoke with edu.

## 2022-05-18 LAB — HFE GENE MUT ANL BLD/T: NORMAL

## 2022-05-18 NOTE — PROGRESS NOTES
Please inform the patient that liver work-up shows some mild fatty liver disease otherwise normal.  Liver function tests have returned to normal.  Recommend he eat a low-fat diet and get plenty of exercise.  Repeat LFTs in 6 months.

## 2022-05-19 ENCOUNTER — TELEPHONE (OUTPATIENT)
Dept: GASTROENTEROLOGY | Facility: CLINIC | Age: 52
End: 2022-05-19

## 2022-05-19 DIAGNOSIS — R79.89 ELEVATED LIVER FUNCTION TESTS: Primary | ICD-10-CM

## 2022-05-19 NOTE — TELEPHONE ENCOUNTER
Called pt using [employer]  #[id number].  Pt was not at home.  left [facility] for pt to call back if he has access to someone who can interpret for him.

## 2022-05-19 NOTE — TELEPHONE ENCOUNTER
Called pt using [employer]  #[phone] and advised of SHAWN Sr note. Reviewed low fat diet with pt. Pt verbalized understanding.     Order placed for labs, msg sent to SHAWN Sr to cosign.

## 2022-05-19 NOTE — TELEPHONE ENCOUNTER
----- Message from ANTONELLA Driver sent at 5/18/2022  3:25 PM EDT -----  Please inform the patient that liver work-up shows some mild fatty liver disease otherwise normal.  Liver function tests have returned to normal.  Recommend he eat a low-fat diet and get plenty of exercise.  Repeat LFTs in 6 months.

## 2024-01-17 ENCOUNTER — TRANSCRIBE ORDERS (OUTPATIENT)
Dept: ADMINISTRATIVE | Facility: HOSPITAL | Age: 54
End: 2024-01-17
Payer: COMMERCIAL

## 2024-01-17 DIAGNOSIS — R10.84 GENERALIZED ABDOMINAL PAIN: Primary | ICD-10-CM

## 2024-01-17 DIAGNOSIS — R10.9 RIGHT FLANK PAIN: ICD-10-CM

## 2024-02-05 ENCOUNTER — TRANSCRIBE ORDERS (OUTPATIENT)
Dept: ADMINISTRATIVE | Facility: HOSPITAL | Age: 54
End: 2024-02-05
Payer: COMMERCIAL

## 2024-02-05 DIAGNOSIS — R10.84 ABDOMINAL PAIN, GENERALIZED: Primary | ICD-10-CM

## 2024-02-05 DIAGNOSIS — R10.9 RIGHT FLANK PAIN: ICD-10-CM

## 2024-02-06 ENCOUNTER — HOSPITAL ENCOUNTER (OUTPATIENT)
Facility: HOSPITAL | Age: 54
Discharge: HOME | End: 2024-02-06
Admitting: FAMILY MEDICINE
Payer: COMMERCIAL

## 2024-02-06 DIAGNOSIS — R10.9 RIGHT FLANK PAIN: ICD-10-CM

## 2024-02-06 DIAGNOSIS — R10.84 ABDOMINAL PAIN, GENERALIZED: ICD-10-CM

## 2024-02-06 PROCEDURE — 74176 CT ABD & PELVIS W/O CONTRAST: CPT

## 2024-02-23 ENCOUNTER — APPOINTMENT (OUTPATIENT)
Dept: CT IMAGING | Facility: HOSPITAL | Age: 54
End: 2024-02-23
Payer: COMMERCIAL

## 2024-02-23 ENCOUNTER — APPOINTMENT (OUTPATIENT)
Dept: GENERAL RADIOLOGY | Facility: HOSPITAL | Age: 54
End: 2024-02-23
Payer: COMMERCIAL

## 2024-02-23 ENCOUNTER — HOSPITAL ENCOUNTER (EMERGENCY)
Facility: HOSPITAL | Age: 54
Discharge: HOME | End: 2024-02-23
Attending: EMERGENCY MEDICINE
Payer: COMMERCIAL

## 2024-02-23 VITALS
DIASTOLIC BLOOD PRESSURE: 84 MMHG | OXYGEN SATURATION: 97 % | BODY MASS INDEX: 32.18 KG/M2 | WEIGHT: 205 LBS | SYSTOLIC BLOOD PRESSURE: 147 MMHG | RESPIRATION RATE: 16 BRPM | HEIGHT: 67 IN | TEMPERATURE: 97.7 F | HEART RATE: 57 BPM

## 2024-02-23 DIAGNOSIS — S16.1XXA STRAIN OF NECK MUSCLE, INITIAL ENCOUNTER: Primary | ICD-10-CM

## 2024-02-23 PROCEDURE — 73562 X-RAY EXAM OF KNEE 3: CPT

## 2024-02-23 PROCEDURE — 99284 EMERGENCY DEPT VISIT MOD MDM: CPT

## 2024-02-23 PROCEDURE — 72125 CT NECK SPINE W/O DYE: CPT

## 2024-02-23 PROCEDURE — 70450 CT HEAD/BRAIN W/O DYE: CPT

## 2024-02-23 PROCEDURE — 73140 X-RAY EXAM OF FINGER(S): CPT

## 2024-02-23 PROCEDURE — 99284 EMERGENCY DEPT VISIT MOD MDM: CPT | Performed by: NURSE PRACTITIONER

## 2024-02-23 PROCEDURE — 73030 X-RAY EXAM OF SHOULDER: CPT

## 2024-02-23 PROCEDURE — 72100 X-RAY EXAM L-S SPINE 2/3 VWS: CPT

## 2024-02-23 RX ORDER — CYCLOBENZAPRINE HCL 10 MG
10 TABLET ORAL 3 TIMES DAILY PRN
Qty: 12 TABLET | Refills: 0 | Status: SHIPPED | OUTPATIENT
Start: 2024-02-23

## 2024-02-23 RX ORDER — IBUPROFEN 800 MG/1
800 TABLET ORAL EVERY 8 HOURS PRN
Qty: 15 TABLET | Refills: 0 | Status: SHIPPED | OUTPATIENT
Start: 2024-02-23
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
## 2024-08-22 ENCOUNTER — TRANSCRIBE ORDERS (OUTPATIENT)
Dept: ADMINISTRATIVE | Facility: HOSPITAL | Age: 54
End: 2024-08-22
Payer: COMMERCIAL

## 2024-08-22 DIAGNOSIS — K63.89 MESENTERIC MASS: Primary | ICD-10-CM

## 2024-12-03 ENCOUNTER — OFFICE VISIT (OUTPATIENT)
Dept: SURGERY | Facility: CLINIC | Age: 54
End: 2024-12-03
Payer: COMMERCIAL

## 2024-12-03 VITALS
OXYGEN SATURATION: 97 % | BODY MASS INDEX: 34.34 KG/M2 | HEART RATE: 77 BPM | SYSTOLIC BLOOD PRESSURE: 142 MMHG | HEIGHT: 67 IN | WEIGHT: 218.8 LBS | DIASTOLIC BLOOD PRESSURE: 88 MMHG

## 2024-12-03 DIAGNOSIS — R10.11 RIGHT UPPER QUADRANT ABDOMINAL PAIN: Primary | ICD-10-CM

## 2024-12-03 PROBLEM — E66.811 OBESITY (BMI 30.0-34.9): Status: ACTIVE | Noted: 2024-12-03

## 2024-12-03 PROCEDURE — 99203 OFFICE O/P NEW LOW 30 MIN: CPT | Performed by: SURGERY

## 2024-12-03 RX ORDER — FLUTICASONE PROPIONATE 50 MCG
2 SPRAY, SUSPENSION (ML) NASAL DAILY
COMMUNITY
Start: 2024-11-20

## 2024-12-03 RX ORDER — AZELASTINE HYDROCHLORIDE 137 UG/1
SPRAY, METERED NASAL
COMMUNITY
Start: 2024-11-20

## 2024-12-03 NOTE — PROGRESS NOTES
CC: Right-sided flank pain     HPI: Patient is a very pleasant 54-year-old male that was referred by Kristei Munguia APRN for evaluation of right-sided flank pain.  The patient reports that since February 2024 he has been experience episodes of right sided flank pain.  The pain can happen anytime, the pain is squeezing in nature.  The pain is located over the right flank and radiate to the back.  The pain can sometimes radiate to the right arm.  The pain last for several hours or minutes.  The pain gets better with rest.  The pain has not been associated with nausea vomiting or any type of food intake.  He was prescribed gabapentin and he found improvement of the back component of his pain but continued to have intermittent episodes of right flank pain.  Denies dysuria or hematuria.  This denies any recent weight loss.  He reports mild discomfort over the epigastric area but no pain.     PMH: Dyspepsia, acid reflux, hypertension    PSH: Upper endoscopy colonoscopy 12/14/2021    MEDS: Allegra, Flonase, Bystolic, vitamin B12, vitamin D3, Zyrtec, Flexeril, Atarax     ALL: No known drug allergies    FH and SH: Family history of hypertension in his father.  History of smoking and alcohol use.  Quit 15 years ago both.  Denies drugs use.  Works daily     ROS:   Constitutional: denies any weight changes, fatigue or weakness.  HEENT: Denies hearing loss and rhinorrhea  Cardiovascular: denies chest pain, palpitations, edemas.  Respiratory: denies cough, sputum, SOB.  Gastrointestinal: denies N&V, abd pain, diarrhea, constipation.  Genitourinary: denies dysuria, frequency.  Endocrine: denies cold intolerance, lethargy and flushing.  Hem: denies excessive bruising and postop bleeding.  Musculoskeletal: denies weakness, joint swelling, pain or stiffness.  Neuro: denies seizures, CVA, paresthesia, or peripheral neuropathy.   Skin: denies change in nevi, rashes, masses.     PE:   Vitals:    12/03/24 1429   BP: 142/88   BP Location:  "Left arm   Patient Position: Sitting   Cuff Size: Adult   Pulse: 77   SpO2: 97%   Weight: 99.2 kg (218 lb 12.8 oz)   Height: 170.2 cm (67\")     Body mass index is 34.27 kg/m².   Alert and oriented ×3, no acute distress.  Head is normocephalic and atraumatic.  Neck is supple  Abdomen is soft and nontender, is nondistended, bowel sounds are positive.  There is no rebound or guarding is and there is no peritoneal signs.  There is no tenderness to palpation over the right flank.  There is no CVA tenderness over the right side.  No clubbing cyanosis or edema in lower or upper extremities  BMI is >= 30 and <35. (Class 1 Obesity). The following options were offered after discussion;: weight loss educational material (shared in after visit summary) and exercise counseling/recommendations      Diagnostic studies:   CT scan abdomen pelvis done on 9/11/2024, only report available.  Mesenteric stranding consistent with panniculitis    CT scan of the abdomen and pelvis 2/6/2024:   Reviewed by me there is mesenteric stranding with normal sinus lymph node.    Labs 2/6/2024: Normal CMP and CBC    Assessment and plan    The patient is a very pleasant 54-year-old male with right flank and back pain.  No clear evidence of gallbladder disease.  This seems to be more skeletal muscular in nature but he reports no complete resolution of his symptoms with gabapentin and over-the-counter pain medication.  Discussed with the patient that because of the exquisite nature of the discomfort that I recommend that he undergoes HIDA scan for further evaluation.  If HIDA scan is normal then I would recommend that he starts physical therapy for treatment of his pain.    Encouraged him to lose some weight    He understood     Scooter Pitts MD  General, Minimally Invasive and Endoscopic Surgery  [facility]     [address], [address]  [place], [address]  P: [phone]  F: [phone]    "

## 2024-12-03 NOTE — LETTER
December 3, 2024     John Haq MD  [address] [facility] [address]  [place] [address]    Patient: Rodrigo Villanueva   YOB: 1970   Date of Visit: 12/3/2024     Dear John Haq MD:       Thank you for referring Rodrigo Villanueva to me for evaluation. Below are the relevant portions of my assessment and plan of care.    If you have questions, please do not hesitate to call me. I look forward to following Rodrigo along with you.         Sincerely,        Scooter Pitts MD        CC: No Recipients    Scooter Pitts MD  12/03/24 1831  Sign when Signing Visit  CC: Right-sided flank pain     HPI: Patient is a very pleasant 54-year-old male that was referred by Kristie Munguia APRN for evaluation of right-sided flank pain.  The patient reports that since February 2024 he has been experience episodes of right sided flank pain.  The pain can happen anytime, the pain is squeezing in nature.  The pain is located over the right flank and radiate to the back.  The pain can sometimes radiate to the right arm.  The pain last for several hours or minutes.  The pain gets better with rest.  The pain has not been associated with nausea vomiting or any type of food intake.  He was prescribed gabapentin and he found improvement of the back component of his pain but continued to have intermittent episodes of right flank pain.  Denies dysuria or hematuria.  This denies any recent weight loss.  He reports mild discomfort over the epigastric area but no pain.     PMH: Dyspepsia, acid reflux, hypertension    PSH: Upper endoscopy colonoscopy 12/14/2021    MEDS: Allegra, Flonase, Bystolic, vitamin B12, vitamin D3, Zyrtec, Flexeril, Atarax     ALL: No known drug allergies    FH and SH: Family history of hypertension in his father.  History of smoking and alcohol use.  Quit 15 years ago both.  Denies drugs use.  Works daily     ROS:   Constitutional: denies any weight changes, fatigue or weakness.  HEENT: Denies hearing  "loss and rhinorrhea  Cardiovascular: denies chest pain, palpitations, edemas.  Respiratory: denies cough, sputum, SOB.  Gastrointestinal: denies N&V, abd pain, diarrhea, constipation.  Genitourinary: denies dysuria, frequency.  Endocrine: denies cold intolerance, lethargy and flushing.  Hem: denies excessive bruising and postop bleeding.  Musculoskeletal: denies weakness, joint swelling, pain or stiffness.  Neuro: denies seizures, CVA, paresthesia, or peripheral neuropathy.   Skin: denies change in nevi, rashes, masses.     PE:   Vitals:    12/03/24 1429   BP: 142/88   BP Location: Left arm   Patient Position: Sitting   Cuff Size: Adult   Pulse: 77   SpO2: 97%   Weight: 99.2 kg (218 lb 12.8 oz)   Height: 170.2 cm (67\")     Body mass index is 34.27 kg/m².   Alert and oriented ×3, no acute distress.  Head is normocephalic and atraumatic.  Neck is supple  Abdomen is soft and nontender, is nondistended, bowel sounds are positive.  There is no rebound or guarding is and there is no peritoneal signs.  There is no tenderness to palpation over the right flank.  There is no CVA tenderness over the right side.  No clubbing cyanosis or edema in lower or upper extremities  BMI is >= 30 and <35. (Class 1 Obesity). The following options were offered after discussion;: weight loss educational material (shared in after visit summary) and exercise counseling/recommendations      Diagnostic studies:   CT scan abdomen pelvis done on 9/11/2024, only report available.  Mesenteric stranding consistent with panniculitis    CT scan of the abdomen and pelvis 2/6/2024:   Reviewed by me there is mesenteric stranding with normal sinus lymph node.    Labs 2/6/2024: Normal CMP and CBC    Assessment and plan    The patient is a very pleasant 54-year-old male with right flank and back pain.  No clear evidence of gallbladder disease.  This seems to be more skeletal muscular in nature but he reports no complete resolution of his symptoms with " gabapentin and over-the-counter pain medication.  Discussed with the patient that because of the exquisite nature of the discomfort that I recommend that he undergoes HIDA scan for further evaluation.  If HIDA scan is normal then I would recommend that he starts physical therapy for treatment of his pain.    Encouraged him to lose some weight    He understood     Scooter Pitts MD  General, Minimally Invasive and Endoscopic Surgery  [facility]     [address], [address]  [place], [place], [address]  P: [phone]  F: [phone]

## 2024-12-09 ENCOUNTER — HOSPITAL ENCOUNTER (OUTPATIENT)
Dept: NUCLEAR MEDICINE | Facility: HOSPITAL | Age: 54
Discharge: HOME | End: 2024-12-09
Payer: COMMERCIAL

## 2024-12-09 DIAGNOSIS — R10.11 RIGHT UPPER QUADRANT ABDOMINAL PAIN: ICD-10-CM

## 2024-12-09 PROCEDURE — 25010000002 SINCALIDE PER 5 MCG: Performed by: SURGERY

## 2024-12-09 PROCEDURE — 34310000005 TECHNETIUM TC 99M MEBROFENIN KIT: Performed by: SURGERY

## 2024-12-09 PROCEDURE — 78227 HEPATOBIL SYST IMAGE W/DRUG: CPT

## 2024-12-09 PROCEDURE — A9537 TC99M MEBROFENIN: HCPCS | Performed by: SURGERY

## 2024-12-09 RX ORDER — KIT FOR THE PREPARATION OF TECHNETIUM TC 99M MEBROFENIN 45 MG/10ML
1 INJECTION, POWDER, LYOPHILIZED, FOR SOLUTION INTRAVENOUS
Status: COMPLETED | OUTPATIENT
Start: 2024-12-09 | End: 2024-12-09

## 2024-12-09 RX ADMIN — SINCALIDE 2 MCG: 5 INJECTION, POWDER, LYOPHILIZED, FOR SOLUTION INTRAVENOUS at 08:05

## 2024-12-09 RX ADMIN — MEBROFENIN 1 DOSE: 45 INJECTION, POWDER, LYOPHILIZED, FOR SOLUTION INTRAVENOUS at 06:58

## 2024-12-11 ENCOUNTER — TELEPHONE (OUTPATIENT)
Dept: SURGERY | Facility: CLINIC | Age: 54
End: 2024-12-11
Payer: COMMERCIAL

## 2024-12-11 DIAGNOSIS — R10.9 RIGHT FLANK PAIN: Primary | ICD-10-CM

## 2024-12-11 NOTE — TELEPHONE ENCOUNTER
Spoke with the patient regarding his HIDA scan results.  He has an ejection fraction of 99%.  Although this is considered normal some patients may have what is called gallbladder hyperkinesia that may cause symptoms when the gallbladder ejection fraction is more than 85%.  Patient did not have any symptoms during the procedure.  Discussed with the patient about the need to start physical therapy and see how he does.  I think that because he has been having pain on the  right flank now for several months then that he will need to be reevaluated for possible cholecystectomy if symptoms do not improve with physical therapy.  He understood  Placed order for physical therapy

## 2024-12-30 ENCOUNTER — TREATMENT (OUTPATIENT)
Dept: PHYSICAL THERAPY | Facility: CLINIC | Age: 54
End: 2024-12-30
Payer: COMMERCIAL

## 2024-12-30 DIAGNOSIS — G89.29 CHRONIC RIGHT-SIDED LOW BACK PAIN, UNSPECIFIED WHETHER SCIATICA PRESENT: ICD-10-CM

## 2024-12-30 DIAGNOSIS — R10.9 FLANK PAIN: Primary | ICD-10-CM

## 2024-12-30 DIAGNOSIS — M54.50 CHRONIC RIGHT-SIDED LOW BACK PAIN, UNSPECIFIED WHETHER SCIATICA PRESENT: ICD-10-CM

## 2024-12-30 PROCEDURE — 97161 PT EVAL LOW COMPLEX 20 MIN: CPT | Performed by: PHYSICAL THERAPIST

## 2024-12-30 PROCEDURE — 97530 THERAPEUTIC ACTIVITIES: CPT | Performed by: PHYSICAL THERAPIST

## 2024-12-30 PROCEDURE — 97110 THERAPEUTIC EXERCISES: CPT | Performed by: PHYSICAL THERAPIST

## 2024-12-30 NOTE — PROGRESS NOTES
Physical Therapy Initial Evaluation and Plan of Care  [facility] [address], [address]  [place] [address]     Patient: Rodrigo Villanueva   : 1970  Referring practitioner: Scooter Pitts MD  Date of Initial Visit: 2024  Today's Date: 2024  Patient seen for 1 sessions  PT Clinic location: [address], [address] [place] [address].  [id number]          Visit Diagnoses:    ICD-10-CM ICD-9-CM   1. Flank pain  R10.9 789.09   2. Chronic right-sided low back pain, unspecified whether sciatica present  M54.50 724.2    G89.29 338.29       Subjective Questionnaire: Back Index: 14%    Subjective Evaluation    History of Present Illness  Mechanism of injury: The patient is a 54 y.o. male presenting to today's evaluation with R flank pain. He reports onset of symptoms in February. He notes intermittent right sided pain that comes up under his ribs and sometimes down his R side. It is a little better right now. He notes pain when sitting. He describes the pain as squeezing. He is not taking any medication for this pain. He has tried a muscle relaxer in the past with no relief. Denies any numbness or tingling. He does get some stiffness in his side. He also notes occasional pain up into his R arm and pain in his calves.  He will follow up with the MD as needed.     Aggravating factors: sitting  Alleviating factors: rest  Imaging: abdominal CT WNL      Occupation: He works in a lab  Prior level of function: independent without difficulty    Current Activity/Functional limitations: He notes pain with sitting. HE gets pain with sitting for any length of time. Sometimes the pain starts immediately and sometimes it comes on later. Denies any difficulty with lifting or sleeping.         Pain  Current pain ratin  At best pain ratin  At worst pain ratin    Patient Goals  Patient goals for therapy: decreased pain         Medical  Please fax to Jinny Campos and Ernesto.  ERIKA history: GERD, HTN. See chart for further detail.         Objective          Postural Observations    Additional Postural Observation Details  POSTURE: B LE ER, B scapular protraction, L scapular elevation, L hip elevation;   PALPATION: denies tenderness with palpation of lumbar region;   GAIT: B hip drop, B LE ER  SLUMP test: (+) B, pain in B calves    Active Range of Motion     Lumbar   Flexion: 70 (stretch in hamstirngs) degrees   Extension: 40 (feels it in R side of back) degrees   Left lateral flexion: 60 (pain* on R) degrees   Right lateral flexion: 60 degrees   Left rotation: 70 degrees   Right rotation: WFL    Strength/Myotome Testing     Left Hip   Planes of Motion   Flexion: 5  External rotation: 5  Internal rotation: 3+ (some discomfort in glute B)    Right Hip   Planes of Motion   Flexion: 5  External rotation: 5  Internal rotation: 3+    Left Knee   Flexion: 5  Extension: 5    Right Knee   Flexion: 5  Extension: 5    Left Ankle/Foot   Dorsiflexion: 5    Right Ankle/Foot   Dorsiflexion: 5    Additional Strength Details  Upper abdominal: 5/5, some discomfort in R side of abdomen  Lower abdominal: 3/5, pain* right side of abdomen            Assessment & Plan       Assessment  Impairments: abnormal or restricted ROM, activity intolerance, impaired physical strength, lacks appropriate home exercise program and pain with function   Assessment details: The patient is a 54 y.o. male who presents to physical therapy today for R flank pain. Upon initial evaluation, the patient demonstrates the following impairments: limited lumbar ROM, core weakness, LE weakness, positive slump test, and impaired postural alignment/gait mechanics.     Due to these impairments, the patient is unable to perform or has difficulty with the following functional tasks: sitting for any length of time. The patient would benefit from skilled PT services to address functional limitations and impairments and to improve patient quality of  life.      Prognosis: good    Goals  Plan Goals: ST. Pt will be independent and compliant with initial HEP in 3 weeks.  2. Pt will report a 20% improvement in symptoms since starting therapy in 3 weeks.  3. Pt will report pain level at worst <7 during prolonged sitting in 3 weeks.  4. Pt will show improvement in body mechanics when lifting and sitting in 2 weeks in order to decrease strain on back.   LT. Pt will be independent with final HEP for self-management of condition by DC.  2. Pt will improve score on Back Index to less than 10% by DC.   3. Pt will report a 60% improvement in symptoms by DC in order to allow return to PLOF.  4. Pt will improve lower abdominal strength to 4/5 or greater to demonstrate improved body mechanics with prolonged sitting and standing.       Plan  Therapy options: will be seen for skilled therapy services  Planned modality interventions: cryotherapy, electrical stimulation/Russian stimulation, TENS, traction, thermotherapy (hydrocollator packs) and ultrasound  Planned therapy interventions: abdominal trunk stabilization, body mechanics training, flexibility, functional ROM exercises, gait training, joint mobilization, home exercise program, manual therapy, neuromuscular re-education, postural training, soft tissue mobilization, spinal/joint mobilization, strengthening, stretching, therapeutic activities and motor coordination training  Frequency: 1x week  Duration in weeks: 4  Treatment plan discussed with: patient      Access Code: [email or web address]  URL: [email or web address]  Date: 2024  Prepared by: Mireille Trejo     See flowsheets for treatment detail.      History # of Personal Factors and/or Comorbidities: LOW (0)  Examination of Body System(s): # of elements: LOW (1-2)  Clinical Presentation: STABLE   Clinical Decision Making: LOW       Timed:         Manual Therapy:         mins  50644;     Therapeutic Exercise:    10     mins  76718;     Neuromuscular  Chintan:        mins  02413;    Therapeutic Activity:     10     mins  93203;     Gait Training:           mins  33573;     Ionto                                   mins   19398  Self Care                            mins   40001      Un-Timed:  Electrical Stimulation:         mins  76450 ( );  Dry Needling          mins self-pay  Traction          mins 65270  Low Eval     20     Mins  93203  Mod Eval          Mins  18914  High Eval                            Mins  03736  Re-Eval                               mins  49471      Timed Treatment:   20   mins   Total Treatment:     40   mins    PT SIGNATURE: Mireille Trejo PT   [place] PT license #: [id number]  DATE TREATMENT INITIATED: 12/30/2024    Initial Certification  Certification Period: 3/29/2025  I certify that the therapy services are furnished while this patient is under my care.  The services outlined above are required by this patient, and will be reviewed every 90 days.    PHYSICIAN: Scooter Pitts MD  NPI: [id number]                                      DATE:     Please sign and return via fax to [place] - Fax #: [phone]. Thank you, [facility].

## 2025-01-13 ENCOUNTER — TREATMENT (OUTPATIENT)
Dept: PHYSICAL THERAPY | Facility: CLINIC | Age: 55
End: 2025-01-13
Payer: COMMERCIAL

## 2025-01-13 DIAGNOSIS — R10.9 FLANK PAIN: Primary | ICD-10-CM

## 2025-01-13 DIAGNOSIS — M54.50 CHRONIC RIGHT-SIDED LOW BACK PAIN, UNSPECIFIED WHETHER SCIATICA PRESENT: ICD-10-CM

## 2025-01-13 DIAGNOSIS — G89.29 CHRONIC RIGHT-SIDED LOW BACK PAIN, UNSPECIFIED WHETHER SCIATICA PRESENT: ICD-10-CM

## 2025-01-13 PROCEDURE — 97112 NEUROMUSCULAR REEDUCATION: CPT | Performed by: PHYSICAL THERAPIST

## 2025-01-13 PROCEDURE — 97110 THERAPEUTIC EXERCISES: CPT | Performed by: PHYSICAL THERAPIST

## 2025-01-13 PROCEDURE — 97530 THERAPEUTIC ACTIVITIES: CPT | Performed by: PHYSICAL THERAPIST

## 2025-01-13 NOTE — PROGRESS NOTES
Physical Therapy Daily Treatment Note    [facility] [address] [facility] [address]  [address] [facility], [address]  [place] [id number]    Visit # 2        Patient: Rodrigo Villanueva   : 1970  Referring practitioner: Scooter Pitts*  Date of Initial Evaluation:  Type: THERAPY  Noted: 2024  Today's Date: 2025           ICD-10-CM ICD-9-CM   1. Flank pain  R10.9 789.09   2. Chronic right-sided low back pain, unspecified whether sciatica present  M54.50 724.2    G89.29 338.29       Subjective  Rodrigo Villanueva reports:   Feeling better overall, reports 0/10 pain currently.  Pain/tightness in (R) flank/thoracic/rib area with sitting vs activity.      Objective   See Exercise, Manual, and Modality Logs for complete treatment     Pt Education:  HEP review - added standing lumbar ROM to HEP  Exercise rationale/ pain free exercise performance  Anatomy and structure of affected musculature  Posture/Postural awareness  Alternate exercise positions  Verbal/Tactile cues to ensure correct exercise performance/technique    Assessment/Plan  Tolerated continued progression of therapeutic exercise/therapeutic activity/neuromuscular re-ed well today, no increased pain reported during or after session.      Did require some review and cueing of previously issued HEP for proper performance. Pt demonstrates improved performance of HEP after review and practice.     Verbal cues were provided throughout for proper techniques and to avoid compensations.     Would continue to benefit from skilled PT progressing with ROM / functional UE strengthening, with progression toward functional WB as tolerated.     Progress per Plan of Care and Progress strengthening /stabilization /functional activity             Timed:         Manual Therapy:         mins  73165     Therapeutic Exercise:     25    mins  64384     Neuromuscular Chintan:    10    mins  56192    Therapeutic Activity:      10    mins  71495     Gait Training:            mins  44822     Ultrasound:          mins  52532    Ionto                                   mins  10219  Self Care                            mins  61379    Un-Timed:  Electrical Stimulation:         mins 28762 ( )  Traction          mins 81853    Timed Treatment:   45   mins   Total Treatment:     45   mins       FABIÁN Nguyễn License #[id number]  Physical Therapist Assistant

## 2025-01-16 ENCOUNTER — TELEPHONE (OUTPATIENT)
Dept: SURGERY | Facility: CLINIC | Age: 55
End: 2025-01-16
Payer: COMMERCIAL

## 2025-01-16 NOTE — TELEPHONE ENCOUNTER
----- Message from Scooter Pitts sent at 12/11/2024  1:48 PM EST -----  I sent the referral for physical therapy for this patient.  He should follow-up in my office 6 weeks after starting physical therapy and see how he is doing

## 2025-01-16 NOTE — TELEPHONE ENCOUNTER
Caller: Rodrigo Villanueva    Relationship to patient: Self    Best call back number: [phone]     Patient is needing: PT RETURNED CALL FROM [facility] SCHEDULED APPT AS INDICATED - NO NEED TO RETURN CALL

## 2025-01-20 ENCOUNTER — TREATMENT (OUTPATIENT)
Dept: PHYSICAL THERAPY | Facility: CLINIC | Age: 55
End: 2025-01-20
Payer: COMMERCIAL

## 2025-01-20 DIAGNOSIS — G89.29 CHRONIC RIGHT-SIDED LOW BACK PAIN, UNSPECIFIED WHETHER SCIATICA PRESENT: ICD-10-CM

## 2025-01-20 DIAGNOSIS — M54.50 CHRONIC RIGHT-SIDED LOW BACK PAIN, UNSPECIFIED WHETHER SCIATICA PRESENT: ICD-10-CM

## 2025-01-20 DIAGNOSIS — R10.9 FLANK PAIN: Primary | ICD-10-CM

## 2025-01-20 PROCEDURE — 97112 NEUROMUSCULAR REEDUCATION: CPT | Performed by: PHYSICAL THERAPIST

## 2025-01-20 PROCEDURE — 97530 THERAPEUTIC ACTIVITIES: CPT | Performed by: PHYSICAL THERAPIST

## 2025-01-20 PROCEDURE — 97110 THERAPEUTIC EXERCISES: CPT | Performed by: PHYSICAL THERAPIST

## 2025-01-20 NOTE — PROGRESS NOTES
Physical Therapy Daily Treatment Note  [facility] Physical Therapy [place]  [address], [address]  [place] [id number]     Patient: Rodrigo Villanueva  : 1970  Referring practitioner: Scooter Pitts MD  Today's Date: 2025    VISIT#: 3    Visit Diagnoses:    ICD-10-CM ICD-9-CM   1. Flank pain  R10.9 789.09   2. Chronic right-sided low back pain, unspecified whether sciatica present  M54.50 724.2    G89.29 338.29       Subjective   Rodrigo Villanueva reports: that he is not having any pain today.       Objective       See Exercise, Manual, and Modality Logs for complete treatment.         Assessment/Plan  The patient tolerates the treatment session without increase in symptoms. He requires minimal verbal cueing for technique throughout the session and is able to correct his form. SL with OH reach is introduced to promote R side flexibility. He notes a stretch but denies pain. He would continue to benefit from skilled intervention with focus on core strength and flexibility.       Progress per Plan of Care          Timed:         Manual Therapy:         mins  57444;     Therapeutic Exercise:    23     mins  01695;     Neuromuscular Chintan:    10    mins  32027;    Therapeutic Activity:     10     mins  78463;     Gait Training:           mins  11186;      Ionto:                                   mins  67476  Self Care:                            mins  74566    Un-Timed:  Electrical Stimulation:         mins  67899 ( );  Dry Needling          mins self-pay  Traction          mins 22907  Re-Eval                               mins  62000  Group Therapy           ___ mins 28889    Timed Treatment:   43   mins   Total Treatment:     46   mins    Mireille Trejo PT  Physical Therapist  [facility] license #: [id number]

## 2025-01-29 ENCOUNTER — TREATMENT (OUTPATIENT)
Dept: PHYSICAL THERAPY | Facility: CLINIC | Age: 55
End: 2025-01-29
Payer: COMMERCIAL

## 2025-01-29 DIAGNOSIS — M54.50 CHRONIC RIGHT-SIDED LOW BACK PAIN, UNSPECIFIED WHETHER SCIATICA PRESENT: ICD-10-CM

## 2025-01-29 DIAGNOSIS — G89.29 CHRONIC RIGHT-SIDED LOW BACK PAIN, UNSPECIFIED WHETHER SCIATICA PRESENT: ICD-10-CM

## 2025-01-29 DIAGNOSIS — R10.9 FLANK PAIN: Primary | ICD-10-CM

## 2025-01-29 NOTE — PROGRESS NOTES
Physical Therapy Daily Treatment Note  [facility] Therapy [place]  [address], [address]  [place] [address]     Patient: Rodrigo Villanueva  : 1970  Referring practitioner: Scooter Pitts MD  Today's Date: 2025    VISIT#: 4    Visit Diagnoses:    ICD-10-CM ICD-9-CM   1. Flank pain  R10.9 789.09   2. Chronic right-sided low back pain, unspecified whether sciatica present  M54.50 724.2    G89.29 338.29       Subjective   Rodrigo Villanueva reports: that he is feeling good today.       Objective       See Exercise, Manual, and Modality Logs for complete treatment.         Assessment/Plan  The patient tolerates the treatment session without increase in symptoms. He requires minimal verbal cueing for technique throughout the session and is able to correct his form. Palloff press is introduced to promote core strength. He finds this challenging but denies pain. He would continue to benefit from skilled intervention with focus on core strength and flexibility.     Progress per Plan of Care          Timed:         Manual Therapy:         mins  92719;     Therapeutic Exercise:    23     mins  57618;     Neuromuscular Chintan:    10    mins  40674;    Therapeutic Activity:     10     mins  59012;     Gait Training:           mins  58443;      Ionto:                                   mins  04917  Self Care:                            mins  80818    Un-Timed:  Electrical Stimulation:         mins  50192 ( );  Dry Needling          mins self-pay  Traction          mins 70127  Re-Eval                               mins  02221  Group Therapy           ___ mins 66772    Timed Treatment:   43   mins   Total Treatment:     47   mins    Mireille Trejo PT  Physical Therapist  [facility] license #: [id number]

## 2025-02-04 ENCOUNTER — OFFICE VISIT (OUTPATIENT)
Dept: SURGERY | Facility: CLINIC | Age: 55
End: 2025-02-04
Payer: COMMERCIAL

## 2025-02-04 VITALS
BODY MASS INDEX: 33.53 KG/M2 | HEART RATE: 72 BPM | OXYGEN SATURATION: 98 % | HEIGHT: 67 IN | SYSTOLIC BLOOD PRESSURE: 126 MMHG | DIASTOLIC BLOOD PRESSURE: 78 MMHG | WEIGHT: 213.6 LBS

## 2025-02-04 DIAGNOSIS — R07.89 XIPHOID PAIN: Primary | ICD-10-CM

## 2025-02-04 PROCEDURE — 99213 OFFICE O/P EST LOW 20 MIN: CPT | Performed by: SURGERY

## 2025-02-04 NOTE — PROGRESS NOTES
"CC: Right flank pain     HPI: Patient is a very pleasant 54-year-old male that is here today for follow-up regarding his right-sided flank pain.  He reports his right-sided flank pain has significantly improved.  He reports now intermittent episodes of pain at the xiphoid process.  The pain does not have any specific trigger.  The pain does not radiate.  The pain last for 20 to 30 minutes.  The pain gets better by itself.  He underwent HIDA scan that showed gallbladder ejection fraction 99%.  He has been able to tolerate diet without any nausea or vomiting     PMH: Dyspepsia, acid reflux, hypertension     PSH: Upper endoscopy colonoscopy 12/14/2021     MEDS: Allegra, Flonase, Bystolic, vitamin B12, vitamin D3, Zyrtec, Flexeril, Atarax     ALL: No known drug allergies     FH and SH: Family history of hypertension in his father.  History of smoking and alcohol use.  Quit 15 years ago both.  Denies drugs use.  Works daily     ROS:   As per HPI     PE:   Vitals:    02/04/25 1300   BP: 126/78   BP Location: Left arm   Patient Position: Sitting   Cuff Size: Adult   Pulse: 72   SpO2: 98%   Weight: 96.9 kg (213 lb 9.6 oz)   Height: 170.2 cm (67\")     Body mass index is 33.45 kg/m².   Alert and oriented ×3, no acute distress.  Head is normocephalic and atraumatic.  Neck is supple  Slightly tender over the xiphoid process  Regular rate and rhythm, no murmurs  Abdomen is soft and nontender  No clubbing cyanosis or edema in lower or upper extremities       Diagnostic studies:   CT scan abdomen pelvis done on 9/11/2024, only report available.  Mesenteric stranding consistent with panniculitis     CT scan of the abdomen and pelvis 2/6/2024:   Reviewed by me there is mesenteric stranding with normal sinus lymph node.     Labs 2/6/2024: Normal CMP and CBC  HIDA scan that was done on 12/9/2024 show gallbladder ejection fraction of 99%.    Assessment and plan    The patient is a very pleasant 54-year-old male with history of right " flank pain that is now resolved.  He has intermittent episodes of pain over the xiphoid process.  He does not have any signs or symptoms of gallbladder disease.  At this point I do not feel patient has symptoms consistent with gallbladder disease.  He does not have any other concerning symptoms.  Discussed with the patient about the need to follow-up in my office if symptoms worsen or recur.  For now we will see how he does.    Scooter Pitts MD, FACS  General, Minimally Invasive and Endoscopic Surgery  [facility]    [address], [address]  [place], [place], [address]  P: [phone]  F: [phone]

## (undated) DEVICE — BITEBLOCK OMNI BLOC

## (undated) DEVICE — LN SMPL CO2 SHTRM SD STREAM W/M LUER

## (undated) DEVICE — SINGLE-USE BIOPSY FORCEPS: Brand: RADIAL JAW 4

## (undated) DEVICE — KT ORCA ORCAPOD DISP STRL

## (undated) DEVICE — ADAPT CLN BIOGUARD AIR/H2O DISP

## (undated) DEVICE — TUBING, SUCTION, 1/4" X 10', STRAIGHT: Brand: MEDLINE

## (undated) DEVICE — SENSR O2 OXIMAX FNGR A/ 18IN NONSTR

## (undated) DEVICE — MSK ENDO PORT O2 POM ELITE CURAPLEX A/